# Patient Record
(demographics unavailable — no encounter records)

---

## 2025-07-30 NOTE — PHYSICAL EXAM
[TextEntry] :    General:  alert, no acute distress   Head:  normocephalic   Eyes:  EOMI bilateral   Ears:  clear tympanic membranes with bony landmarks and light reflex present bilaterally   Nose:  pink nasal mucosa   Mouth:  nonerythematous oropharynx   Neck:  supple, full passive range of motion, no palpable masses   Lungs:  clear to auscultation bilaterally   Cardiac:  regular rate and rhythm, normal S1, S2 audible, no murmurs   Abdomen:  soft, non tender, non distended, no hepatomegaly, no splenomegaly   Lymphatics:  no abnormal lymph nodes palpated.   Genitalia: testes descended, no masses noted, no adhesions T5 Musculoskeletal:  normal muscle tone, no gait asymmetry, no pain or deformities with palpation of bone, muscles, joints   Spine:  straight   Neurologic:  cranial nerves grossly intact   Skin: clear , no abnormal lesions

## 2025-07-30 NOTE — DISCUSSION/SUMMARY
[FreeTextEntry1] : Continue and/or try to have a balanced diet with all food groups. Brush teeth twice a day with toothbrush. Recommend visit to dentist. Maintain consistent daily routines and sleep schedule. Risky behaviors assessed. School discussed. Try to limit screen time to no more than 2 hours per day. Encourage physical activity. Return 1 year for routine well visit check. Full activity without restriction s including Physical Education & Athletics coordination of care reviewed 5-2-1-0 reviewed  cardiac checklist -reviewed  TWIN screening was reviewed

## 2025-07-30 NOTE — RISK ASSESSMENT
[PHQ-9 Negative - No further assessment needed] : PHQ-9 Negative - No further assessment needed [No Increased risk of SCA or SCD] : No Increased risk of SCA or SCD    no

## 2025-07-30 NOTE — HISTORY OF PRESENT ILLNESS
[FreeTextEntry7] : 20 YR [facility] [FreeTextEntry1] : Parent(s)/patient has no current concerns or issues. Appetite good - eats a variety of foods most of the time -drinks water Has regular dental visits and brushes teeth Sleeping well/good sleeping patterns No problems in school identified -  no ADD/ADHD concerns. No recent severe illness   no emergency room visit, and  no trauma to the head /concussion. Denies depression or psychiatric issues.  Activities: at least one hour of daily activity No reactions to previous vaccinations. Denies use of alcohol or illicit drugs and/or recreational drugs Denies Cigarette smoking and is sexually active with protection wisdom teeth removal,  nasal surgery for polyp and deviation and injury to arm- fractured